# Patient Record
(demographics unavailable — no encounter records)

---

## 2024-11-18 NOTE — PHYSICAL EXAM
[TextEntry] : General: awake, alert, cooperative, appropriate, no acute distress Head: no signs injury Eyes: EOMI, PERRL, no discharge, no conjunctival or scleral erythema  Ears: tympanic membranes clear bilaterally without erythema or purulent effusion, normal light reflex Nose: +rhinorrhea, inflamed nasal turbinates bilaterally, no maxillary or frontal sinus tenderness Mouth: mucosa moist and pink, + erythema to the oropharynx, no exudates, + soft palate petechiae, no vesicles or lesions Neck: supple, good range of motion Lungs: clear to auscultation bilaterally  Cardiac: normal S1 S2, regular rate and rhythm Abdomen: soft, non tender, non distended Lymphatics: + cervical lymphadenopathy bilaterally, no pre or post auricular lymphadenopathy, no occipital lymphadenopathy Skin: no rash

## 2024-11-18 NOTE — PLAN
[TextEntry] : 17 year girl found to be rapid strep positive. Complete 10 days of antibiotics. Use antipyretics as needed. Return for follow up in 2 weeks is any symptoms remain. Change toothbrush after 2 days on antibiotic, change towels, bed linen, clothes, and clean surfaces. After being on antibiotics for at least 24 hours patient less likely to spread infection.

## 2024-11-18 NOTE — HISTORY OF PRESENT ILLNESS
[de-identified] : sore throat and congestion. No fevers [FreeTextEntry6] : sore throat saturday no fever now feels "sinusy" with nasal pressure